# Patient Record
Sex: MALE | Race: WHITE | NOT HISPANIC OR LATINO
[De-identification: names, ages, dates, MRNs, and addresses within clinical notes are randomized per-mention and may not be internally consistent; named-entity substitution may affect disease eponyms.]

---

## 2023-02-15 ENCOUNTER — NON-APPOINTMENT (OUTPATIENT)
Age: 48
End: 2023-02-15

## 2023-02-15 ENCOUNTER — APPOINTMENT (OUTPATIENT)
Dept: ORTHOPEDIC SURGERY | Facility: CLINIC | Age: 48
End: 2023-02-15
Payer: COMMERCIAL

## 2023-02-15 VITALS
SYSTOLIC BLOOD PRESSURE: 137 MMHG | HEIGHT: 70 IN | DIASTOLIC BLOOD PRESSURE: 94 MMHG | WEIGHT: 202 LBS | HEART RATE: 77 BPM | BODY MASS INDEX: 28.92 KG/M2

## 2023-02-15 DIAGNOSIS — M25.511 PAIN IN RIGHT SHOULDER: ICD-10-CM

## 2023-02-15 PROCEDURE — 20611 DRAIN/INJ JOINT/BURSA W/US: CPT | Mod: RT

## 2023-02-15 PROCEDURE — 73030 X-RAY EXAM OF SHOULDER: CPT | Mod: RT

## 2023-02-15 PROCEDURE — 99214 OFFICE O/P EST MOD 30 MIN: CPT | Mod: 25

## 2023-02-21 NOTE — PROCEDURE
[de-identified] : Using sterile technique, 2cc of depomedrol (40mg/ml) and 3cc of 1% plain lidocaine was drawn up into a sterile 5cc syringe.  The posterior lateral corner of the right shoulder was then sterilely prepped with chlorhexidine and ethylene chloride spray was used as an anesthetic prior to injection.  The subacromial space was imaged with the Meier Lumify ultrasound probe.  The depomedrol/lidocaine mixture was injected into the subacromial space under ultrasound guidance.  A spot image of the injection was saved..  The patient tolerated the procedure well without difficulty.  The patient was given instructions on the use of ice and anti-inflammatories post injection site soreness.

## 2023-02-21 NOTE — HISTORY OF PRESENT ILLNESS
[de-identified] : This patient presents today with complaints of right shoulder pain ongoing for about 3 years.  States that the pain started during COVID.  Is been getting worse recently.  His problems reaching overhead or behind his back.  He notes pain worse at nighttime.  He states he did an MRI about 8 months ago ordered by another orthopedist.  Denies any injury.  Pain localized to the anterior aspect of the shoulder.  The patient states that the pain is worse with activity and improved by rest. The patient denies numbness and tingling, radicular symptoms, or bowel/bladder dysfunction.  Currently not taking any NSAIDs or pain medicine.

## 2023-02-21 NOTE — DISCUSSION/SUMMARY
[de-identified] : This patient presents today with significant pain and decreased range of motion about the right shoulder.  He has severe restriction of motion on physical exam with guarding.  His physical exam and x-rays reveals evidence of calcific tendinitis.  This is also confirmed by MRI which also shows evidence of some mild partial tearing of the rotator cuff.  I discussed the diagnosis with the patient length as well as treatment recommendations.  At this point due to the severe restriction of motion and pain I recommended performed a subacromial steroid injection under ultrasound control.  Instructions are given postinjection for ice analgesics and modification of activities.  I like to see him back in approximately 10 days for follow-up and reevaluation.  At that point we can determine if any further treatment would be indicated by any residual symptoms the patient may have.  At least 45 minutes spent on reviewing patient's prior office notes and MRI, evaluation and examination at today's visit, discussion of treatment planning, performing of the subacromial steroid injection under ultrasound guidance.

## 2023-02-21 NOTE — PHYSICAL EXAM
[de-identified] : The patient appears well nourished  and in no apparent distress.  The patient is alert and oriented to person, place, and time.   Affect and mood appear normal.    The head is normocephalic and atraumatic.  The eyes reveal normal sclera and extra ocular muscles are intact.   The neck appears normal with no jugular venous distention or masses noted.   Skin shows normal turgor with no evidence of eczema or psoriasis.  No respiratory distress noted.  The patient ambulates with a normal gait.\par \par The right shoulder has difficult guarding with decreased range of motion.  He is only able to forward flex with assistance to about 100 degrees.  Remaining ranges of motion are also severely limited.  Positive impingement sign and positive Lubin sign.  Tenderness noted over the anterior acromion and subacromial space.  Rotator cuff strength could not be tested secondary to pain and limitation of motion..   There is no soft tissue swelling.  There is no eccyhmosis.  There is no warmth or erythema.    There is no instabililty on exam.  No lymphadenopathy or edema is noted.  Pulses and capillary refill are normal.  There is no muscular atrophy.  Strength and sensation are intact distally.    [de-identified] : AP,outlet,  and glenoid and axillary views of the right shoulder were obtained.  There is a focus of calcific tendinitis in the rotator cuff insertion.  The glenohumeral and acromioclavicular joints are well maintained without evidence of degenerative arthritis.   There no fracture, dislocation, or subluxation.  \par \par MRI was brought in by the patient from an outside physician.  The MRI shows evidence of calcific tendinitis with low-grade partial tearing of the infraspinatus.  There is also low-grade tearing posterior supraspinatus.  Partial tearing of posterior labrum.  Subacromial bursitis, os acromiale.

## 2023-02-27 ENCOUNTER — APPOINTMENT (OUTPATIENT)
Dept: ORTHOPEDIC SURGERY | Facility: CLINIC | Age: 48
End: 2023-02-27

## 2023-03-03 RX ORDER — LIDOCAINE HYDROCHLORIDE 10 MG/ML
1 INJECTION, SOLUTION INFILTRATION; PERINEURAL
Refills: 0 | Status: COMPLETED | OUTPATIENT
Start: 2023-03-03

## 2023-03-03 RX ORDER — METHYLPRED ACET/NACL,ISO-OS/PF 40 MG/ML
40 VIAL (ML) INJECTION
Qty: 1 | Refills: 0 | Status: COMPLETED | OUTPATIENT
Start: 2023-03-03

## 2023-03-03 RX ADMIN — Medication %: at 00:00

## 2023-03-03 RX ADMIN — METHYLPREDNISOLONE ACETATE MG/ML: 40 INJECTION, SUSPENSION INTRA-ARTICULAR; INTRALESIONAL; INTRAMUSCULAR; SOFT TISSUE at 00:00

## 2023-03-20 ENCOUNTER — APPOINTMENT (OUTPATIENT)
Dept: ORTHOPEDIC SURGERY | Facility: CLINIC | Age: 48
End: 2023-03-20
Payer: COMMERCIAL

## 2023-03-20 VITALS — HEART RATE: 92 BPM | SYSTOLIC BLOOD PRESSURE: 113 MMHG | DIASTOLIC BLOOD PRESSURE: 74 MMHG

## 2023-03-20 PROCEDURE — 99213 OFFICE O/P EST LOW 20 MIN: CPT

## 2023-04-11 ENCOUNTER — APPOINTMENT (OUTPATIENT)
Dept: ORTHOPEDIC SURGERY | Facility: CLINIC | Age: 48
End: 2023-04-11
Payer: COMMERCIAL

## 2023-04-11 VITALS
SYSTOLIC BLOOD PRESSURE: 132 MMHG | BODY MASS INDEX: 28.92 KG/M2 | HEART RATE: 90 BPM | DIASTOLIC BLOOD PRESSURE: 78 MMHG | WEIGHT: 202 LBS | HEIGHT: 70 IN

## 2023-04-11 DIAGNOSIS — M75.111 INCOMPLETE ROTATOR CUFF TEAR OR RUPTURE OF RIGHT SHOULDER, NOT SPECIFIED AS TRAUMATIC: ICD-10-CM

## 2023-04-11 DIAGNOSIS — M75.31 CALCIFIC TENDINITIS OF RIGHT SHOULDER: ICD-10-CM

## 2023-04-11 DIAGNOSIS — M25.811 OTHER SPECIFIED JOINT DISORDERS, RIGHT SHOULDER: ICD-10-CM

## 2023-04-11 PROCEDURE — 99214 OFFICE O/P EST MOD 30 MIN: CPT

## 2023-04-12 PROBLEM — M25.811 OS ACROMIALE OF RIGHT SHOULDER: Status: ACTIVE | Noted: 2023-02-21

## 2023-04-12 NOTE — HISTORY OF PRESENT ILLNESS
[de-identified] : This patient presents for follow-up to review the MRI results of the right shoulder.  Patient has a history of calcific tendinitis and partial-thickness rotator cuff tear.  We did obtain an MRI to rule out worsening of the partial-thickness tear.  He presents today for follow-up and review.  Pain level is 9 out of 10 and worse with activity such as reaching pulling pushing and lifting.  Pain is also worse when he sleeps on that side.  Pain is improved with rest.

## 2023-04-12 NOTE — PHYSICAL EXAM
[de-identified] : The patient appears well nourished  and in no apparent distress.  The patient is alert and oriented to person, place, and time.   Affect and mood appear normal.    The head is normocephalic and atraumatic.  The eyes reveal normal sclera and extra ocular muscles are intact.   The neck appears normal with no jugular venous distention or masses noted.   Skin shows normal turgor with no evidence of eczema or psoriasis.  No respiratory distress noted.  The patient ambulates with a normal gait.\par \par The right shoulder has decreased range of motion with guarding guarding with decreased range of motion.  He is only able to forward flex with assistance to about 140 degrees.  .  Positive impingement sign and positive Lubin sign.  Tenderness noted over the anterior acromion and subacromial space.  Rotator cuff strength could not be tested secondary to pain and limitation of motion..   There is no soft tissue swelling.  There is no eccyhmosis.  There is no warmth or erythema.    There is no instabililty on exam.  No lymphadenopathy or edema is noted.  Pulses and capillary refill are normal.  There is no muscular atrophy.  Strength and sensation are intact distally.    [de-identified] : MRI was reviewed.  The MRI shows evidence of intermediate to high-grade bursal surface tearing of the supraspinatus tendon.  There is also evidence of calcific tendinitis and os acromiale.  Mild degenerative cartilage loss is noted.

## 2023-04-12 NOTE — DISCUSSION/SUMMARY
[de-identified] : On today's visit we discussed the MRI findings.  The MRI shows evidence of high-grade partial-thickness tear of the rotator cuff with evidence of calcific tendinitis as well.  I discussed the treatment recommendations at this point.  I did recommend arthroscopy with evaluation of the rotator cuff tear.  We will perform rotator cuff repair as indicated.  We will also release the calcific tendinitis of the rotator cuff as well.  The risks benefits and alternative treatment plans of the surgical procedure were explained to the patient. The patient had the opportunity to ask any questions which answered to their satisfaction. The patient will schedule surgery at their convenience.

## 2023-04-12 NOTE — REASON FOR VISIT
[Follow-Up Visit] : a follow-up visit for [FreeTextEntry2] : Calcific tendinitis of right shoulder Os Acromiale right shoulder. MRI of right shoulder for review today

## 2023-05-09 ENCOUNTER — OUTPATIENT (OUTPATIENT)
Dept: OUTPATIENT SERVICES | Facility: HOSPITAL | Age: 48
LOS: 1 days | End: 2023-05-09
Payer: COMMERCIAL

## 2023-05-09 VITALS
HEART RATE: 80 BPM | SYSTOLIC BLOOD PRESSURE: 146 MMHG | OXYGEN SATURATION: 99 % | DIASTOLIC BLOOD PRESSURE: 86 MMHG | WEIGHT: 220.24 LBS | TEMPERATURE: 98 F | RESPIRATION RATE: 14 BRPM | HEIGHT: 70 IN

## 2023-05-09 DIAGNOSIS — Z98.890 OTHER SPECIFIED POSTPROCEDURAL STATES: Chronic | ICD-10-CM

## 2023-05-09 DIAGNOSIS — M75.31 CALCIFIC TENDINITIS OF RIGHT SHOULDER: ICD-10-CM

## 2023-05-09 DIAGNOSIS — M75.111 INCOMPLETE ROTATOR CUFF TEAR OR RUPTURE OF RIGHT SHOULDER, NOT SPECIFIED AS TRAUMATIC: ICD-10-CM

## 2023-05-09 DIAGNOSIS — Z01.818 ENCOUNTER FOR OTHER PREPROCEDURAL EXAMINATION: ICD-10-CM

## 2023-05-09 LAB
ALBUMIN SERPL ELPH-MCNC: 3.9 G/DL — SIGNIFICANT CHANGE UP (ref 3.3–5)
ALP SERPL-CCNC: 64 U/L — SIGNIFICANT CHANGE UP (ref 30–120)
ALT FLD-CCNC: 67 U/L DA — HIGH (ref 10–60)
ANION GAP SERPL CALC-SCNC: 11 MMOL/L — SIGNIFICANT CHANGE UP (ref 5–17)
AST SERPL-CCNC: 49 U/L — HIGH (ref 10–40)
BILIRUB SERPL-MCNC: 0.6 MG/DL — SIGNIFICANT CHANGE UP (ref 0.2–1.2)
BUN SERPL-MCNC: 11 MG/DL — SIGNIFICANT CHANGE UP (ref 7–23)
CALCIUM SERPL-MCNC: 9.1 MG/DL — SIGNIFICANT CHANGE UP (ref 8.4–10.5)
CHLORIDE SERPL-SCNC: 102 MMOL/L — SIGNIFICANT CHANGE UP (ref 96–108)
CO2 SERPL-SCNC: 24 MMOL/L — SIGNIFICANT CHANGE UP (ref 22–31)
CREAT SERPL-MCNC: 1.05 MG/DL — SIGNIFICANT CHANGE UP (ref 0.5–1.3)
EGFR: 89 ML/MIN/1.73M2 — SIGNIFICANT CHANGE UP
GLUCOSE SERPL-MCNC: 99 MG/DL — SIGNIFICANT CHANGE UP (ref 70–99)
HCT VFR BLD CALC: 43.8 % — SIGNIFICANT CHANGE UP (ref 39–50)
HGB BLD-MCNC: 15.2 G/DL — SIGNIFICANT CHANGE UP (ref 13–17)
MCHC RBC-ENTMCNC: 30 PG — SIGNIFICANT CHANGE UP (ref 27–34)
MCHC RBC-ENTMCNC: 34.7 GM/DL — SIGNIFICANT CHANGE UP (ref 32–36)
MCV RBC AUTO: 86.4 FL — SIGNIFICANT CHANGE UP (ref 80–100)
NRBC # BLD: 0 /100 WBCS — SIGNIFICANT CHANGE UP (ref 0–0)
PLATELET # BLD AUTO: 210 K/UL — SIGNIFICANT CHANGE UP (ref 150–400)
POTASSIUM SERPL-MCNC: 3.9 MMOL/L — SIGNIFICANT CHANGE UP (ref 3.5–5.3)
POTASSIUM SERPL-SCNC: 3.9 MMOL/L — SIGNIFICANT CHANGE UP (ref 3.5–5.3)
PROT SERPL-MCNC: 8.1 G/DL — SIGNIFICANT CHANGE UP (ref 6–8.3)
RBC # BLD: 5.07 M/UL — SIGNIFICANT CHANGE UP (ref 4.2–5.8)
RBC # FLD: 12 % — SIGNIFICANT CHANGE UP (ref 10.3–14.5)
SODIUM SERPL-SCNC: 137 MMOL/L — SIGNIFICANT CHANGE UP (ref 135–145)
WBC # BLD: 4.96 K/UL — SIGNIFICANT CHANGE UP (ref 3.8–10.5)
WBC # FLD AUTO: 4.96 K/UL — SIGNIFICANT CHANGE UP (ref 3.8–10.5)

## 2023-05-09 PROCEDURE — 93010 ELECTROCARDIOGRAM REPORT: CPT

## 2023-05-09 PROCEDURE — 85027 COMPLETE CBC AUTOMATED: CPT

## 2023-05-09 PROCEDURE — 80053 COMPREHEN METABOLIC PANEL: CPT

## 2023-05-09 PROCEDURE — G0463: CPT

## 2023-05-09 PROCEDURE — 36415 COLL VENOUS BLD VENIPUNCTURE: CPT

## 2023-05-09 PROCEDURE — 93005 ELECTROCARDIOGRAM TRACING: CPT

## 2023-05-09 NOTE — H&P PST ADULT - NSANTHOSAYNRD_GEN_A_CORE
neck/No. HYUN screening performed.  STOP BANG Legend: 0-2 = LOW Risk; 3-4 = INTERMEDIATE Risk; 5-8 = HIGH Risk neck 19 inches/No. HYUN screening performed.  STOP BANG Legend: 0-2 = LOW Risk; 3-4 = INTERMEDIATE Risk; 5-8 = HIGH Risk

## 2023-05-09 NOTE — H&P PST ADULT - PROBLEM SELECTOR PLAN 1
right shoulder arthroscopy, release calcific tendinitis, possible rotator cuff repair. patient dies not have a PCP and was told by Dr. Sen's office that it is okay to proceed without clearance. He was told today that as long as EKG and blood work are WNL we can proceed. surgical wash instructions reviewed and verbalized understanding. instructed not to use marijuana for 1 week prior to surgery

## 2023-05-09 NOTE — H&P PST ADULT - NSICDXPASTMEDICALHX_GEN_ALL_CORE_FT
PAST MEDICAL HISTORY:  Benign labile hypertension     COVID-19 vaccine series completed     Decreased right shoulder range of motion     Environmental allergies     History of alcohol abuse     History of fracture of left ankle     Incomplete tear of right rotator cuff     Right shoulder pain

## 2023-05-09 NOTE — H&P PST ADULT - HISTORY OF PRESENT ILLNESS
48 yo male presents with c/o right shoulder pain for the last 5 years. He states that he thought it was from overuse playing sports and the pain was mild in nature. in 2020 he had a fall, fracturing the left ankle and states that the right shoulder pain worsened after that in attempts to break the fall. The pain has progressively worsened and now reports decreased ROM and decreased strength. cortisone injection 2-3 months ago only gave him 1 week of pain relief. Pain is now 1-2/10 at rest and increased to 8-9/10 with use of the arm and when supine. Denies using any current pain relief measures. 46 yo male presents with c/o right shoulder pain for the last 5 years. He states that he thought it was from overuse playing sports and the pain was mild in nature. In 2020 he had a fall, fracturing the left ankle and states that the right shoulder pain worsened after that in attempts to break the fall. The pain has progressively worsened and now reports decreased ROM and decreased strength. cortisone injection 2-3 months ago only gave him 1 week of pain relief. Pain is now 1-2/10 at rest and increased to 8-9/10 with use of the arm and when supine. Denies using any current pain relief measures.

## 2023-05-09 NOTE — H&P PST ADULT - MUSCULOSKELETAL
details… right shoulder/no joint swelling/no joint erythema/no joint warmth/no calf tenderness/decreased ROM/decreased ROM due to pain/normal gait/strength 5/5 bilateral lower extremities/decreased strength

## 2023-05-09 NOTE — H&P PST ADULT - REASON FOR ADMISSION
How Severe Is Your Skin Lesion?: mild
Have Your Skin Lesions Been Treated?: not been treated
Is This A New Presentation, Or A Follow-Up?: Skin Lesions
Additional History: Pain 0/10.
"right shoulder pain"

## 2023-05-09 NOTE — H&P PST ADULT - COMMENTS
denies any current cold or flu like symptoms, including fever, cough, sinus congestion, body aches or chills

## 2023-05-09 NOTE — H&P PST ADULT - NSICDXFAMILYHX_GEN_ALL_CORE_FT
FAMILY HISTORY:  Father  Still living? Yes, Estimated age: 71-80  Family history of hypertension, Age at diagnosis: Age Unknown    Mother  Still living? Yes, Estimated age: 71-80  Family history of hyperlipidemia, Age at diagnosis: Age Unknown  Family history of hypertension, Age at diagnosis: Age Unknown    Sibling  Still living? Yes, Estimated age: 41-50  Family history of hypertension, Age at diagnosis: Age Unknown

## 2023-05-12 ENCOUNTER — TRANSCRIPTION ENCOUNTER (OUTPATIENT)
Age: 48
End: 2023-05-12

## 2023-05-25 ENCOUNTER — TRANSCRIPTION ENCOUNTER (OUTPATIENT)
Age: 48
End: 2023-05-25

## 2023-05-26 ENCOUNTER — OUTPATIENT (OUTPATIENT)
Dept: OUTPATIENT SERVICES | Facility: HOSPITAL | Age: 48
LOS: 1 days | End: 2023-05-26
Payer: COMMERCIAL

## 2023-05-26 ENCOUNTER — TRANSCRIPTION ENCOUNTER (OUTPATIENT)
Age: 48
End: 2023-05-26

## 2023-05-26 ENCOUNTER — APPOINTMENT (OUTPATIENT)
Dept: ORTHOPEDIC SURGERY | Facility: HOSPITAL | Age: 48
End: 2023-05-26

## 2023-05-26 VITALS
OXYGEN SATURATION: 100 % | HEIGHT: 71 IN | TEMPERATURE: 97 F | WEIGHT: 216.05 LBS | RESPIRATION RATE: 14 BRPM | DIASTOLIC BLOOD PRESSURE: 104 MMHG | SYSTOLIC BLOOD PRESSURE: 164 MMHG | HEART RATE: 68 BPM

## 2023-05-26 VITALS — SYSTOLIC BLOOD PRESSURE: 135 MMHG | DIASTOLIC BLOOD PRESSURE: 90 MMHG

## 2023-05-26 DIAGNOSIS — M75.31 CALCIFIC TENDINITIS OF RIGHT SHOULDER: ICD-10-CM

## 2023-05-26 DIAGNOSIS — Z98.890 OTHER SPECIFIED POSTPROCEDURAL STATES: Chronic | ICD-10-CM

## 2023-05-26 DIAGNOSIS — M75.111 INCOMPLETE ROTATOR CUFF TEAR OR RUPTURE OF RIGHT SHOULDER, NOT SPECIFIED AS TRAUMATIC: ICD-10-CM

## 2023-05-26 PROCEDURE — 29826 SHO ARTHRS SRG DECOMPRESSION: CPT | Mod: RT

## 2023-05-26 PROCEDURE — 29827 SHO ARTHRS SRG RT8TR CUF RPR: CPT | Mod: RT

## 2023-05-26 PROCEDURE — ZZZZZ: CPT

## 2023-05-26 PROCEDURE — C1713: CPT

## 2023-05-26 DEVICE — IMP SPEEDBRIDGE W/BIO-COMP SWIVLCK/NDL 4.75X19.1MM: Type: IMPLANTABLE DEVICE | Site: RIGHT | Status: FUNCTIONAL

## 2023-05-26 RX ORDER — SODIUM CHLORIDE 9 MG/ML
1000 INJECTION, SOLUTION INTRAVENOUS
Refills: 0 | Status: DISCONTINUED | OUTPATIENT
Start: 2023-05-26 | End: 2023-05-26

## 2023-05-26 RX ORDER — ONDANSETRON 8 MG/1
4 TABLET, FILM COATED ORAL ONCE
Refills: 0 | Status: DISCONTINUED | OUTPATIENT
Start: 2023-05-26 | End: 2023-05-26

## 2023-05-26 RX ORDER — HYDROMORPHONE HYDROCHLORIDE 2 MG/ML
0.5 INJECTION INTRAMUSCULAR; INTRAVENOUS; SUBCUTANEOUS
Refills: 0 | Status: DISCONTINUED | OUTPATIENT
Start: 2023-05-26 | End: 2023-05-26

## 2023-05-26 RX ORDER — CEFAZOLIN SODIUM 1 G
2000 VIAL (EA) INJECTION ONCE
Refills: 0 | Status: COMPLETED | OUTPATIENT
Start: 2023-05-26 | End: 2023-05-26

## 2023-05-26 RX ORDER — HYDROMORPHONE HYDROCHLORIDE 2 MG/ML
1 INJECTION INTRAMUSCULAR; INTRAVENOUS; SUBCUTANEOUS
Refills: 0 | Status: DISCONTINUED | OUTPATIENT
Start: 2023-05-26 | End: 2023-05-26

## 2023-05-26 RX ORDER — OXYCODONE AND ACETAMINOPHEN 5; 325 MG/1; MG/1
1 TABLET ORAL
Qty: 42 | Refills: 0
Start: 2023-05-26 | End: 2023-06-01

## 2023-05-26 RX ORDER — CHLORHEXIDINE GLUCONATE 213 G/1000ML
1 SOLUTION TOPICAL ONCE
Refills: 0 | Status: COMPLETED | OUTPATIENT
Start: 2023-05-26 | End: 2023-05-26

## 2023-05-26 RX ADMIN — CHLORHEXIDINE GLUCONATE 1 APPLICATION(S): 213 SOLUTION TOPICAL at 07:17

## 2023-05-26 NOTE — ASU DISCHARGE PLAN (ADULT/PEDIATRIC) - BATHING
Keep incision area clean and dry.  Cover with waterproof bag & tape/Shower only Keep incision area clean and dry.  Cover with waterproof bag & tape/Shower only/Do not submerge in water

## 2023-05-26 NOTE — ASU DISCHARGE PLAN (ADULT/PEDIATRIC) - NS MD DC FALL RISK RISK
For information on Fall & Injury Prevention, visit: [email or web address] OR  [email or web address] OR  https://www.cdc.gov/steadi/patient.html

## 2023-05-26 NOTE — BRIEF OPERATIVE NOTE - SPECIMENS
none
PAST MEDICAL HISTORY:  Autism     IBS (irritable bowel syndrome)     Mental retardation profound

## 2023-05-26 NOTE — ASU DISCHARGE PLAN (ADULT/PEDIATRIC) - ASU DC SPECIAL INSTRUCTIONSFT
FOLLOW enclosed shoulder arthroscopy brochure.  Call MD for severe pain/fever/chills  Ice to shoulder 20 minutes on and off the first 48 hours after surgery to help decrease pain/swelling  Keep shoulder elevated in sling at all times.  Keep your shoulder close to your body and do not externally rotate shoulder in order to preserve rotator cuff repair.  No heavy lifting/pushing/pulling/twisting/weight bearing with shoulder.  Pump fists 10x an hour to help with circulation  Your most comfortable sleeping position will be propped up  Pain medication as needed, take a stool softener like senna or colace to decrease constipation while taking pain medicine

## 2023-05-26 NOTE — ASU PATIENT PROFILE, ADULT - FALL HARM RISK - UNIVERSAL INTERVENTIONS
Bed in lowest position, wheels locked, appropriate side rails in place/Call bell, personal items and telephone in reach/Instruct patient to call for assistance before getting out of bed or chair/Non-slip footwear when patient is out of bed/[place] to call system/Physically safe environment - no spills, clutter or unnecessary equipment/Purposeful Proactive Rounding/Room/bathroom lighting operational, light cord in reach

## 2023-05-26 NOTE — ASU DISCHARGE PLAN (ADULT/PEDIATRIC) - CALL YOUR DOCTOR IF YOU HAVE ANY OF THE FOLLOWING:
Bleeding that does not stop/Pain not relieved by Medications/Fever greater than (need to indicate Fahrenheit or Celsius)/Wound/Surgical Site with redness, or foul smelling discharge or pus/Numbness, tingling, color or temperature change to extremity Bleeding that does not stop/Swelling that gets worse/Pain not relieved by Medications/Fever greater than (need to indicate Fahrenheit or Celsius)/Wound/Surgical Site with redness, or foul smelling discharge or pus/Numbness, tingling, color or temperature change to extremity

## 2023-05-26 NOTE — ASU DISCHARGE PLAN (ADULT/PEDIATRIC) - CARE PROVIDER_API CALL
Jerad Sen  Orthopaedic Surgery  [address], [address]  [place] [address]  Phone: [phone]  Fax: [phone]  Follow Up Time:

## 2023-05-26 NOTE — ASU DISCHARGE PLAN (ADULT/PEDIATRIC) - ACTIVITY LEVEL
Sling at all times, may remove for bathing only/No heavy lifting/No weight bearing/Elevate extremity Sling at all times, may remove for bathing only/No excercise/No heavy lifting/No sports/gym/No weight bearing/Elevate extremity

## 2023-05-27 RX ORDER — CELECOXIB 200 MG/1
200 CAPSULE ORAL TWICE DAILY
Qty: 60 | Refills: 0 | Status: ACTIVE | COMMUNITY
Start: 2023-05-27 | End: 1900-01-01

## 2023-05-30 ENCOUNTER — EMERGENCY (EMERGENCY)
Facility: HOSPITAL | Age: 48
LOS: 1 days | Discharge: HOME | End: 2023-05-30
Attending: EMERGENCY MEDICINE | Admitting: EMERGENCY MEDICINE
Payer: COMMERCIAL

## 2023-05-30 VITALS
HEIGHT: 71 IN | RESPIRATION RATE: 16 BRPM | DIASTOLIC BLOOD PRESSURE: 90 MMHG | OXYGEN SATURATION: 96 % | TEMPERATURE: 98 F | SYSTOLIC BLOOD PRESSURE: 150 MMHG | WEIGHT: 220.02 LBS | HEART RATE: 76 BPM

## 2023-05-30 VITALS
RESPIRATION RATE: 16 BRPM | SYSTOLIC BLOOD PRESSURE: 135 MMHG | DIASTOLIC BLOOD PRESSURE: 78 MMHG | HEART RATE: 70 BPM | TEMPERATURE: 98 F | OXYGEN SATURATION: 97 %

## 2023-05-30 DIAGNOSIS — Z98.890 OTHER SPECIFIED POSTPROCEDURAL STATES: Chronic | ICD-10-CM

## 2023-05-30 DIAGNOSIS — R58 HEMORRHAGE, NOT ELSEWHERE CLASSIFIED: ICD-10-CM

## 2023-05-30 PROBLEM — M25.611 STIFFNESS OF RIGHT SHOULDER, NOT ELSEWHERE CLASSIFIED: Chronic | Status: ACTIVE | Noted: 2023-05-09

## 2023-05-30 PROBLEM — Z91.09 OTHER ALLERGY STATUS, OTHER THAN TO DRUGS AND BIOLOGICAL SUBSTANCES: Chronic | Status: ACTIVE | Noted: 2023-05-09

## 2023-05-30 PROBLEM — Z92.29 PERSONAL HISTORY OF OTHER DRUG THERAPY: Chronic | Status: ACTIVE | Noted: 2023-05-09

## 2023-05-30 PROBLEM — I10 ESSENTIAL (PRIMARY) HYPERTENSION: Chronic | Status: ACTIVE | Noted: 2023-05-09

## 2023-05-30 PROBLEM — M75.111 INCOMPLETE ROTATOR CUFF TEAR OR RUPTURE OF RIGHT SHOULDER, NOT SPECIFIED AS TRAUMATIC: Chronic | Status: ACTIVE | Noted: 2023-05-09

## 2023-05-30 PROBLEM — Z87.81 PERSONAL HISTORY OF (HEALED) TRAUMATIC FRACTURE: Chronic | Status: ACTIVE | Noted: 2023-05-09

## 2023-05-30 PROBLEM — M25.511 PAIN IN RIGHT SHOULDER: Chronic | Status: ACTIVE | Noted: 2023-05-09

## 2023-05-30 PROBLEM — F10.11 ALCOHOL ABUSE, IN REMISSION: Chronic | Status: ACTIVE | Noted: 2023-05-09

## 2023-05-30 PROCEDURE — 99284 EMERGENCY DEPT VISIT MOD MDM: CPT

## 2023-05-30 PROCEDURE — 99282 EMERGENCY DEPT VISIT SF MDM: CPT

## 2023-05-30 NOTE — ED ADULT NURSE NOTE - OBJECTIVE STATEMENT
Pt came in for bruising on his right shoulder and upper arm s/p right shoulder surgery last week. Pt denies any SOB , chest pain or intractable pain. Surgical wound is dry and clean.

## 2023-05-30 NOTE — ED PROVIDER NOTE - MUSCULOSKELETAL, MLM
There are several surgical port scars on right shoulder covered by Steri-Strips.  There is bruising and ecchymosis below surgery site down into upper arm.  There is no cellulitis or drainage.  There is no tenderness.  Findings consistent with postop bruising.

## 2023-05-30 NOTE — ED PROVIDER NOTE - CLINICAL SUMMARY MEDICAL DECISION MAKING FREE TEXT BOX
47-year-old male had right rotator cuff surgery 3 days ago with Dr. Sen, complaining of bruising and swelling in right upper arm that he noticed yesterday.  Denies fever drainage increasing pain or other symptom.  Has been taking oxycodone for pain.  He spoke to someone and orthopedic office and was told to come to ER for evaluation.    Physical exam consistent with postoperative bruising no sign of infection.  Plan is will get Ortho PA to evaluate and redress.  Follow-up orthopedics as per Ortho PA.

## 2023-05-30 NOTE — ED PROVIDER NOTE - PATIENT PORTAL LINK FT
You can access the FollowMyHealth Patient Portal offered by [facility] by registering at the following website: [email or web address]. By joining [facility]’s FollowMyHealth portal, you will also be able to view your health information using other applications (apps) compatible with our system.

## 2023-05-30 NOTE — ED PROVIDER NOTE - NSFOLLOWUPINSTRUCTIONS_ED_ALL_ED_FT
Incision Care, Adult  An incision is a surgical cut that is made through your skin. Most incisions are closed after a surgical procedure. Your incision may be closed with stitches (sutures), staples, skin glue, or adhesive strips. You may need to return to your health care provider to have sutures or staples removed. This may occur several days or several weeks after your surgery. Until then, the incision needs to be cared for properly to prevent infection. Follow instructions from your health care provider about how to care for your incision.    Supplies needed:  Soap, water, and a clean hand towel.  Wound cleanser.  A clean bandage (dressing), if needed.  Cream or ointment, if told by your health care provider.  Clean gauze.  How to care for your incision  Cleaning the incision    Ask your health care provider how to clean the incision. This may include:  Wearing medical gloves.  Using mild soap and water, or wound cleanser.  Using a clean gauze to pat the incision dry after cleaning it.  Dressing changes    Wash your hands with soap and water for at least 20 seconds before and after you change the dressing. If soap and water are not available, use hand .  Do not use disinfectants or antiseptics, such as rubbing alcohol, to clean open wounds unless told by your health care provider.  Change your dressing as told by your health care provider.  Leave sutures, staples, skin glue, or adhesive strips in place. These skin closures may need to stay in place for 2 weeks or longer. If adhesive strip edges start to loosen and curl up, you may trim the loose edges. Do not remove adhesive strips completely unless your health care provider tells you to do that.  Apply cream or ointment. Do this only as told by your health care provider.  Cover the incision with a clean dressing. Ask your health care provider when you can start to leave the incision uncovered.  Checking for infection    Two stitched incisions. One is normal. The other is red with pus and infected.  Check your incision area every day for signs of infection. Check for:  More redness, swelling, or pain.  More fluid or blood.  New warmth, hardness, or a rash that develops along the incision.  Pus or a bad smell.  Follow these instructions at home  Medicines    Take over-the-counter and prescription medicines only as told by your health care provider.  If you were prescribed an antibiotic medicine, cream, or ointment, take or apply it as told by your health care provider. Do not stop using the antibiotic even if your condition improves.  Eating and drinking    Eat a diet that includes protein, vitamin A, vitamin C, and other nutrient-rich foods to help the wound heal.  Foods rich in protein include meat, fish, eggs, dairy, beans, nuts, and protein supplement drinks.  Foods rich in vitamin A include carrots and dark green, leafy vegetables.  Foods rich in vitamin C include citrus fruits, tomatoes, broccoli, and peppers.  Drink enough fluid to keep your urine pale yellow.  General instructions    A sign showing that a person should not take a bath.  Do not take baths, swim, use a hot tub, or do anything that would put the incision underwater until your health care provider approves. Ask your health care provider if you may take showers. You may only be allowed to take sponge baths.  Limit movement around your incision to promote healing.  Avoid straining, lifting, or exercising for the first 2 weeks after your procedure, or for as long as told by your health care provider.  Return to your normal activities as told by your health care provider. Ask your health care provider what activities are safe for you.  Do not scratch, pick, or scrub the incision. Keep it covered as told by your health care provider.  Protect your incision from the sun when you are outside for the first 6 months, or for as long as told by your health care provider. Cover up the scar area or apply sunscreen that has an SPF of at least 30.  Do not use any products that contain nicotine or tobacco, such as cigarettes, e-cigarettes, and chewing tobacco. These can delay incision healing. If you need help quitting, ask your health care provider.  Keep all follow-up visits. This is important.  Contact a health care provider if:  You have any of these signs of infection:  More redness, swelling, or pain around your incision.  More fluid or blood coming from your incision.  New warmth or hardness around your incision.  Pus or a bad smell coming from your incision.  A rash that develops along the incision.  You feel nauseous or you vomit.  You are dizzy.  Your sutures, staples, skin glue, or adhesive strips come undone.  Your wound gets bigger.  You have a fever.  Get help right away if:  Your incision bleeds through the dressing and the bleeding does not stop with gentle pressure.  The edges of your incision open up and separate.  These symptoms may represent a serious problem that is an emergency. Do not wait to see if the symptoms will go away. Get medical help right away. Call your local emergency services (911 in the [place] Do not drive yourself to the hospital.    Summary  Follow instructions from your health care provider about how to care for your incision.  Wash your hands with soap and water for at least 20 seconds before and after you change the dressing. If soap and water are not available, use hand .  Check your incision area every day for signs of infection.  Keep all follow-up visits. This is important.  This information is not intended to replace advice given to you by your health care provider. Make sure you discuss any questions you have with your health care provider.

## 2023-05-30 NOTE — ED ADULT TRIAGE NOTE - CHIEF COMPLAINT QUOTE
s/p right rotator cuff surgery and c/o bruising to right upper arm. strong radial pulses. warm and dry distally

## 2023-05-30 NOTE — ED ADULT NURSE NOTE - NSFALLRISKINTERV_ED_ALL_ED

## 2023-05-30 NOTE — ED PROVIDER NOTE - CARE PROVIDER_API CALL
Jerad Sen  Orthopaedic Surgery  [address], [address]  [place] [address]  Phone: [phone]  Fax: [phone]  Established Patient  Follow Up Time: 1-3 Days

## 2023-05-30 NOTE — CONSULT NOTE ADULT - SUBJECTIVE AND OBJECTIVE BOX
48 yo M arrived in ED regarding concern over bruising on the Right shoulder after arthroscopic rotator cuff repair on friday with Dr. Sen. Per medication history, patient is not on any anti-coagulants.  Patient has been using his sling as prescribed and denies any pain out of proportion.  Patient denies any popping or abnormal motion of the shoulder since the surgery.  Denies Fevers/Chills/SOB/CP/Nausea/Vomiting/Paresthesias/Numbness    Physical Exam:  General: AAOx3, resting comfortably on bed  RUE: Arm in Sling         Dressing removed and Incision sites are well approximated with Steri-Strips over all sites. No exudate or drainage noted. All sites clean/dry with minimal dried blood appreciated.         Some bruising noted on the anterior aspect of the RUE/Upper arm. The bruising appears to be in a gravity dependent feature approximately 28cm long and 10 cm at its widest. Ecchymosis is dark purple/bluish and seems to have been gravity dependent and pooled near elbow.         +Radial Pulses B/L        +SILT in B/L upper extremity and hands        + Thumbs up/OK sign/ strength/Extension of all fingers.        + Sensation in 1st web space b/l         Denies TTP on bruised area or down arm        ROM of Shoulder unable to be assessed secondary to RTC repair precautions              46yo M s/p Right Arthroscopic RTC repair POD#4 with bruising     - RUE Bruising - likely normal post operative swelling without any recent injury or abnormal movement since surgery  - Redressed incision sites with Gauze and Tegaderm  -Reviewed post-operative precautions for RTC repair  -RUE: Arm in sling at all times, Non-weight bearing   -F/U with surgeon for wound check and post-operative assessment   - Contact surgeon's office for further questions.   -Continue post-operative medications as prescribed.   -Dr. Sen contacted and aware.

## 2023-05-30 NOTE — CONSULT NOTE ADULT - PROBLEM SELECTOR RECOMMENDATION 9
see above plan  Surgeon aware.  NWB of RUE and sling at all times  Post-op Rotator Cuff Repair Precautions

## 2023-05-30 NOTE — ED PROVIDER NOTE - OBJECTIVE STATEMENT
47-year-old male had right rotator cuff surgery 3 days ago with Dr. Sen, complaining of bruising and swelling in right upper arm that he noticed yesterday.  Denies fever drainage increasing pain or other symptom.  Has been taking oxycodone for pain.  He spoke to someone and orthopedic office and was told to come to ER for evaluation.

## 2023-06-06 ENCOUNTER — APPOINTMENT (OUTPATIENT)
Dept: ORTHOPEDIC SURGERY | Facility: CLINIC | Age: 48
End: 2023-06-06

## 2023-06-07 ENCOUNTER — APPOINTMENT (OUTPATIENT)
Dept: ORTHOPEDIC SURGERY | Facility: CLINIC | Age: 48
End: 2023-06-07
Payer: COMMERCIAL

## 2023-06-07 VITALS — DIASTOLIC BLOOD PRESSURE: 85 MMHG | HEART RATE: 76 BPM | SYSTOLIC BLOOD PRESSURE: 128 MMHG

## 2023-06-07 PROCEDURE — 99024 POSTOP FOLLOW-UP VISIT: CPT

## 2023-06-07 PROCEDURE — 73030 X-RAY EXAM OF SHOULDER: CPT | Mod: RT

## 2023-07-15 ENCOUNTER — APPOINTMENT (OUTPATIENT)
Dept: ORTHOPEDIC SURGERY | Facility: CLINIC | Age: 48
End: 2023-07-15
Payer: COMMERCIAL

## 2023-07-15 VITALS — HEART RATE: 78 BPM | DIASTOLIC BLOOD PRESSURE: 90 MMHG | SYSTOLIC BLOOD PRESSURE: 150 MMHG

## 2023-07-15 PROCEDURE — 99024 POSTOP FOLLOW-UP VISIT: CPT

## 2023-09-18 ENCOUNTER — APPOINTMENT (OUTPATIENT)
Dept: ORTHOPEDIC SURGERY | Facility: CLINIC | Age: 48
End: 2023-09-18
Payer: COMMERCIAL

## 2023-09-18 VITALS — SYSTOLIC BLOOD PRESSURE: 129 MMHG | HEART RATE: 81 BPM | DIASTOLIC BLOOD PRESSURE: 89 MMHG

## 2023-09-18 PROCEDURE — 99213 OFFICE O/P EST LOW 20 MIN: CPT

## 2023-10-24 ENCOUNTER — APPOINTMENT (OUTPATIENT)
Dept: ORTHOPEDIC SURGERY | Facility: CLINIC | Age: 48
End: 2023-10-24
Payer: COMMERCIAL

## 2023-10-24 DIAGNOSIS — Z98.890 OTHER SPECIFIED POSTPROCEDURAL STATES: ICD-10-CM

## 2023-10-24 PROCEDURE — 99213 OFFICE O/P EST LOW 20 MIN: CPT

## 2023-11-27 ENCOUNTER — APPOINTMENT (OUTPATIENT)
Dept: ORTHOPEDIC SURGERY | Facility: CLINIC | Age: 48
End: 2023-11-27

## 2024-04-22 ENCOUNTER — APPOINTMENT (OUTPATIENT)
Dept: OPHTHALMOLOGY | Facility: CLINIC | Age: 49
End: 2024-04-22

## 2024-06-08 ENCOUNTER — NON-APPOINTMENT (OUTPATIENT)
Age: 49
End: 2024-06-08

## 2024-09-22 ENCOUNTER — NON-APPOINTMENT (OUTPATIENT)
Age: 49
End: 2024-09-22

## 2024-10-02 NOTE — ASU PATIENT PROFILE, ADULT - NSICDXPASTSURGICALHX_GEN_ALL_CORE_FT
October 2, 2024     Patient: Hedy Chan   YOB: 2009   Date of Visit: 10/2/2024       To Whom it May Concern:    Hedy Chan was seen in my clinic on 10/2/2024 at 3:00 pm.     Please excuse Hedy for his absence from school on the date listed above to be able to make his appointment.    Sincerely,            Mario Montalvo, DO    Medical information is confidential and cannot be disclosed without the written consent of the patient or his representative.       PAST SURGICAL HISTORY:  Status post ORIF of fracture of ankle

## 2024-10-17 NOTE — ASU PATIENT PROFILE, ADULT - NS TRANSFER DISPOSITION PATIENT BELONGINGS
normal gait and station , no tenderness or deformities present , normal gait and station , no tenderness or deformities present
with patient

## 2025-05-07 ENCOUNTER — APPOINTMENT (OUTPATIENT)
Dept: ORTHOPEDIC SURGERY | Facility: CLINIC | Age: 50
End: 2025-05-07
Payer: COMMERCIAL

## 2025-05-07 DIAGNOSIS — M25.811 OTHER SPECIFIED JOINT DISORDERS, RIGHT SHOULDER: ICD-10-CM

## 2025-05-07 DIAGNOSIS — Z98.890 OTHER SPECIFIED POSTPROCEDURAL STATES: ICD-10-CM

## 2025-05-07 PROCEDURE — 99214 OFFICE O/P EST MOD 30 MIN: CPT

## 2025-05-07 PROCEDURE — 73030 X-RAY EXAM OF SHOULDER: CPT | Mod: RT

## 2025-07-14 ENCOUNTER — APPOINTMENT (OUTPATIENT)
Dept: ORTHOPEDIC SURGERY | Facility: CLINIC | Age: 50
End: 2025-07-14
Payer: COMMERCIAL

## 2025-07-14 PROCEDURE — 99213 OFFICE O/P EST LOW 20 MIN: CPT

## (undated) DEVICE — DRAPE 3/4 SHEET 52X76"

## (undated) DEVICE — SLING SHOULDER ABDUCTION LARGE

## (undated) DEVICE — TUBING DEPUY MITEK FMS OUTFLOW

## (undated) DEVICE — GLV 7.5 PROTEXIS (WHITE)

## (undated) DEVICE — BAG SPONGE COUNTER EZ

## (undated) DEVICE — POSITIONER POSITIONING ROLL  5X17"

## (undated) DEVICE — DRAPE SPLIT SHEET 77" X 108"

## (undated) DEVICE — BUR LINVATEC OVAL 5MM X 13CM

## (undated) DEVICE — BOOT COVER NONSKID IMPERVIOUS

## (undated) DEVICE — DRSG RESTRAINT LIMB WRAP AROUND

## (undated) DEVICE — SOL IRR BAG NS 0.9% 3000ML

## (undated) DEVICE — ELCTR VAPR COOL PULSE

## (undated) DEVICE — TUBING SUCTION 20FT

## (undated) DEVICE — DRAPE TOP SHEET 53" X 101"

## (undated) DEVICE — WARMING BLANKET LOWER ADULT

## (undated) DEVICE — DRAPE IOBAN 23" X 23"

## (undated) DEVICE — POSITIONER FOAM HEAD CRADLE (PINK)

## (undated) DEVICE — PACK SHOULDER ARTHROSCOPY

## (undated) DEVICE — NDL SPINAL 18G X 3.5" (PINK)

## (undated) DEVICE — POSITIONER S&N FACE MASK SPIDER 2

## (undated) DEVICE — GLV 8 PROTEXIS (WHITE)

## (undated) DEVICE — CANNULA ARTHREX TWIST IN 6MMX7CM

## (undated) DEVICE — POSITIONER STRAP ARMBOARD VELCRO TS-30

## (undated) DEVICE — DVC SUCTION FLR PUDDLE GUPPY

## (undated) DEVICE — CANNULA ARTHREX TWIST IN NO SQUIRT CAP 7X7 PURPLE

## (undated) DEVICE — VENODYNE/SCD SLEEVE CALF MEDIUM

## (undated) DEVICE — CANNULA LINVATEC SHOULDER 7CM (GREY & ORANGE)

## (undated) DEVICE — SHAVER BLADE GATOR 4.8MM

## (undated) DEVICE — TUBING DEPUY MITEK FMS VUE INFLOW

## (undated) DEVICE — SYR LUER LOK 50CC

## (undated) DEVICE — POSITIONER STIRRUP STRAP W SLIP RING 19X3.5"